# Patient Record
Sex: MALE | Race: WHITE | NOT HISPANIC OR LATINO | URBAN - METROPOLITAN AREA
[De-identification: names, ages, dates, MRNs, and addresses within clinical notes are randomized per-mention and may not be internally consistent; named-entity substitution may affect disease eponyms.]

---

## 2018-01-10 NOTE — PATIENT DISCUSSION
"""Phaco with IOL OS: 01/18/2018 Marciano PAZ ZKB00 16.0 Target: [facility]

## 2018-01-18 NOTE — PATIENT DISCUSSION
"""S/P IOL OS: Tecnis MF ZKB00 16.0 (Target: [facility]) +Femto/Arcs +TM. Continue post operative instructions and drops per schedule.  """

## 2018-01-26 NOTE — PATIENT DISCUSSION
"""S/P IOL OS: Tecnis MF ZKB00 16.0 (Target: [facility]) +Femto/Arcs +TM. Continue post operative instructions and drops per schedule.  """

## 2018-02-23 NOTE — PATIENT DISCUSSION
"""Recommended OTC artificial tears two - four times a day as needed. "" ""Start Artificial tears both eyes BID-QID.  Systane Balance or Refresh Advanced"""

## 2021-04-02 NOTE — PATIENT DISCUSSION
"""Retinal tear OD. Refer to retina specialist for emergent care. Discussed risks of permanent vision loss with the patient and the importance for immediate care. """

## 2022-03-28 NOTE — PATIENT DISCUSSION
"""Retinal tear OD. Refer to retina specialist for emergent care. Discussed risks of permanent vision loss with the patient and the importance for immediate care. ""."

## 2022-03-29 ENCOUNTER — ESTABLISHED PATIENT (OUTPATIENT)
Dept: URBAN - METROPOLITAN AREA CLINIC 20 | Facility: CLINIC | Age: 67
End: 2022-03-29

## 2022-03-29 DIAGNOSIS — H52.4: ICD-10-CM

## 2022-03-29 DIAGNOSIS — H25.813: ICD-10-CM

## 2022-03-29 PROCEDURE — 92310E CONTACT LENS 100

## 2022-03-29 PROCEDURE — 92014 COMPRE OPH EXAM EST PT 1/>: CPT

## 2022-03-29 PROCEDURE — 92015 DETERMINE REFRACTIVE STATE: CPT

## 2022-03-29 ASSESSMENT — VISUAL ACUITY
OD_CC: 20/40
OS_CC: 20/30
OU_SC: 20/20

## 2022-03-29 ASSESSMENT — TONOMETRY
OS_IOP_MMHG: 9
OD_IOP_MMHG: 11

## 2022-04-08 NOTE — PATIENT DISCUSSION
Symptoms of Retinal tear and detachment reviewed. Patient understands to call immediately with any such symptoms. Will monitor.

## 2023-01-18 NOTE — PATIENT DISCUSSION
2023    INSURER: Payor: [facility] / Plan: [facility] / Product Type: Indemnity /   Re: Prior Authorization Request  Patient: Alvarez Subramanian  Policy ID#:  [id number]  : 1963      To Whom it May Concern:    I am writing to formally request a prior authorization of coverage for my patient,  Alvarez Subramanian, for treatment using Nicholls Thyroid.  I am requesting authorization for applicable provider professional and facility services associated with this therapy.    The therapy involves Nicholls Thyroid.    I have treated Alvarez Subramanian since 2022 and I have determined that it is medically appropriate for  this patient to receive be treated with Nicholls Thyroid for the reason(s) stated below:      Hypothyroidism      Levothyroxine      Levothyroxine previously ineffective in controlling TSH.  Additionally, continue to have significant symptoms of hypothyroidism, dry skin, fatigue and hair loss, while was taking levothyroxine.  Has been on Nicholls Thyroid for a number of years from outside provider.  Nicholls Thyroid has worked well to control hypothyroidism, TSH and hypothyroidism symptoms.      Patient's quality of life will be significantly decreased without Nicholls Thyroid.    I have included medical records pertaining to the patient s medical history, current condition and treatment plan.  In addition, the following billing codes will         I firmly believe that this therapy is clinically appropriate and that Alvarez Subramanian would benefit from improved quality of life if allowed the opportunity to receive this treatment.  Please contact me at Dept: [phone] if you require additional information to ensure the prompt approval for coverage.    Please send your written decision to me at this address:  [facility]  [id number]  [place] [id number] [phone]  Dept: [phone]      Sincerely,      MARTINA Willard   """Call if vision decreases or RD warning signs increases/RD warnings given.  No signs of left eye retinal tear

## 2023-01-24 NOTE — PATIENT DISCUSSION
Consulted with Dr. Fredy Lambert. Will refer to [facility] for consult. Will have acrios AC IOL and previous scans sent to [facility].

## 2023-04-04 ENCOUNTER — ESTABLISHED PATIENT (OUTPATIENT)
Dept: URBAN - METROPOLITAN AREA CLINIC 20 | Facility: CLINIC | Age: 68
End: 2023-04-04

## 2023-04-04 DIAGNOSIS — H52.4: ICD-10-CM

## 2023-04-04 DIAGNOSIS — H25.813: ICD-10-CM

## 2023-04-04 PROCEDURE — 92014 COMPRE OPH EXAM EST PT 1/>: CPT

## 2023-04-04 PROCEDURE — 92310E CONTACT LENS 100

## 2023-04-04 PROCEDURE — 92015 DETERMINE REFRACTIVE STATE: CPT

## 2023-04-04 ASSESSMENT — KERATOMETRY
OS_AXISANGLE_DEGREES: 155
OD_AXISANGLE2_DEGREES: 84
OD_K1POWER_DIOPTERS: 45.00
OD_K2POWER_DIOPTERS: 45.50
OD_AXISANGLE_DEGREES: 174
OS_K1POWER_DIOPTERS: 44.25
OS_AXISANGLE2_DEGREES: 65
OS_K2POWER_DIOPTERS: 44.75

## 2023-04-04 ASSESSMENT — VISUAL ACUITY
OU_SC: J1-1
OU_CC: 20/30
OS_CC: 20/40
OD_CC: 20/30-1

## 2023-04-04 ASSESSMENT — TONOMETRY
OS_IOP_MMHG: 14
OD_IOP_MMHG: 11